# Patient Record
Sex: FEMALE | Race: ASIAN | Employment: FULL TIME | URBAN - METROPOLITAN AREA
[De-identification: names, ages, dates, MRNs, and addresses within clinical notes are randomized per-mention and may not be internally consistent; named-entity substitution may affect disease eponyms.]

---

## 2017-02-05 DIAGNOSIS — E03.9 HYPOTHYROIDISM (ACQUIRED): Primary | ICD-10-CM

## 2017-02-06 RX ORDER — LEVOTHYROXINE SODIUM 0.07 MG/1
TABLET ORAL
Qty: 90 TABLET | Refills: 0 | Status: SHIPPED | OUTPATIENT
Start: 2017-02-06 | End: 2017-05-05

## 2017-02-15 DIAGNOSIS — I10 ESSENTIAL HYPERTENSION: Primary | ICD-10-CM

## 2017-02-16 RX ORDER — LOSARTAN POTASSIUM 50 MG/1
TABLET ORAL
Qty: 90 TABLET | Refills: 0 | Status: SHIPPED | OUTPATIENT
Start: 2017-02-16 | End: 2017-06-13

## 2017-02-16 NOTE — TELEPHONE ENCOUNTER
Rx sent to retail. Note sent as well reminding pt of pending labs. Pt is not due to be seen in the office again until next month.

## 2017-05-05 DIAGNOSIS — E03.9 HYPOTHYROIDISM (ACQUIRED): Primary | ICD-10-CM

## 2017-05-05 RX ORDER — LEVOTHYROXINE SODIUM 0.07 MG/1
TABLET ORAL
Qty: 30 TABLET | Refills: 0 | Status: SHIPPED | OUTPATIENT
Start: 2017-05-05 | End: 2017-06-09

## 2017-05-05 NOTE — TELEPHONE ENCOUNTER
Spoke with patient and advised that she is due for a follow up appointment and labs before further refills. Labs were ordered 9/2016. She verbalized understanding and agreed with plan.    Ok per Provider to send 30 day supply

## 2017-05-08 RX ORDER — LEVOTHYROXINE SODIUM 0.07 MG/1
TABLET ORAL
Qty: 90 TABLET | Refills: 0 | OUTPATIENT
Start: 2017-05-08

## 2017-05-15 DIAGNOSIS — F51.04 PSYCHOPHYSIOLOGICAL INSOMNIA: ICD-10-CM

## 2017-05-16 RX ORDER — ZOLPIDEM TARTRATE 5 MG/1
TABLET ORAL
Qty: 30 TABLET | Refills: 0 | Status: SHIPPED
Start: 2017-05-16 | End: 2017-06-12

## 2017-05-17 ENCOUNTER — OFFICE VISIT (OUTPATIENT)
Dept: INTERNAL MEDICINE CLINIC | Facility: CLINIC | Age: 57
End: 2017-05-17

## 2017-05-17 VITALS
HEART RATE: 90 BPM | SYSTOLIC BLOOD PRESSURE: 114 MMHG | TEMPERATURE: 98 F | OXYGEN SATURATION: 98 % | WEIGHT: 222 LBS | BODY MASS INDEX: 39 KG/M2 | DIASTOLIC BLOOD PRESSURE: 78 MMHG | RESPIRATION RATE: 16 BRPM

## 2017-05-17 DIAGNOSIS — S46.812A TRAPEZIUS STRAIN, LEFT, INITIAL ENCOUNTER: Primary | ICD-10-CM

## 2017-05-17 PROCEDURE — 99213 OFFICE O/P EST LOW 20 MIN: CPT | Performed by: STUDENT IN AN ORGANIZED HEALTH CARE EDUCATION/TRAINING PROGRAM

## 2017-05-17 RX ORDER — METHYLPREDNISOLONE 4 MG/1
TABLET ORAL
Qty: 1 KIT | Refills: 0 | Status: SHIPPED | OUTPATIENT
Start: 2017-05-17 | End: 2017-05-31 | Stop reason: ALTCHOICE

## 2017-05-17 RX ORDER — CYCLOBENZAPRINE HCL 10 MG
10 TABLET ORAL NIGHTLY
Qty: 15 TABLET | Refills: 0 | Status: SHIPPED | OUTPATIENT
Start: 2017-05-17 | End: 2017-10-18 | Stop reason: ALTCHOICE

## 2017-05-17 NOTE — PROGRESS NOTES
HPI:    Patient ID: Luciano Gaytan is a 49year old female who presents today c/o left shoulder pain since Sunday morning. Patient reports doing Veronica class on Saturday. Shoulder Pain   The pain is present in the left shoulder.  This is a new problem LOSARTAN POTASSIUM 50 MG Oral Tab TAKE 1 TABLET (50 MG TOTAL) BY MOUTH DAILY. Disp: 90 tablet Rfl: 0   CALCIUM + D OR Take 1 Tab by mouth daily.  Disp:  Rfl:    [DISCONTINUED] ZOLPIDEM TARTRATE 5 MG Oral Tab TAKE 1 TABLET BY MOUTH DAILY AT BEDTIME AS NEEDED The patient indicates understanding of these issues and agrees to the plan. The patient is asked to return in 7-10 days or sooner if symptoms persist or worsen. No orders of the defined types were placed in this encounter.        Meds This Visit:  Clinton Patino

## 2017-05-17 NOTE — PATIENT INSTRUCTIONS
Muscle Strain in the Extremities  A muscle strain is a stretching and tearing of muscle fibers. This causes pain, especially when you move that muscle. There may also be some swelling and bruising.   Home care  · Keep the hurt area raised to reduce pain a

## 2017-05-20 ENCOUNTER — LAB ENCOUNTER (OUTPATIENT)
Dept: LAB | Facility: HOSPITAL | Age: 57
End: 2017-05-20
Attending: INTERNAL MEDICINE
Payer: COMMERCIAL

## 2017-05-20 DIAGNOSIS — Z13.29 THYROID DISORDER SCREEN: ICD-10-CM

## 2017-05-20 DIAGNOSIS — Z13.0 SCREENING, ANEMIA, DEFICIENCY, IRON: ICD-10-CM

## 2017-05-20 DIAGNOSIS — Z13.220 LIPID SCREENING: ICD-10-CM

## 2017-05-20 DIAGNOSIS — Z13.89 SCREENING FOR GENITOURINARY CONDITION: ICD-10-CM

## 2017-05-20 DIAGNOSIS — D89.9 IMMUNE DISORDER (HCC): ICD-10-CM

## 2017-05-20 DIAGNOSIS — Z00.00 LABORATORY EXAMINATION ORDERED AS PART OF A ROUTINE GENERAL MEDICAL EXAMINATION: ICD-10-CM

## 2017-05-20 PROCEDURE — 86803 HEPATITIS C AB TEST: CPT

## 2017-05-20 PROCEDURE — 84443 ASSAY THYROID STIM HORMONE: CPT

## 2017-05-20 PROCEDURE — 81003 URINALYSIS AUTO W/O SCOPE: CPT

## 2017-05-20 PROCEDURE — 80053 COMPREHEN METABOLIC PANEL: CPT

## 2017-05-20 PROCEDURE — 80061 LIPID PANEL: CPT

## 2017-05-20 PROCEDURE — 85025 COMPLETE CBC W/AUTO DIFF WBC: CPT

## 2017-05-20 PROCEDURE — 83036 HEMOGLOBIN GLYCOSYLATED A1C: CPT

## 2017-05-20 PROCEDURE — 36415 COLL VENOUS BLD VENIPUNCTURE: CPT

## 2017-05-31 ENCOUNTER — OFFICE VISIT (OUTPATIENT)
Dept: INTERNAL MEDICINE CLINIC | Facility: CLINIC | Age: 57
End: 2017-05-31

## 2017-05-31 VITALS
RESPIRATION RATE: 16 BRPM | BODY MASS INDEX: 38.5 KG/M2 | OXYGEN SATURATION: 99 % | WEIGHT: 220 LBS | DIASTOLIC BLOOD PRESSURE: 84 MMHG | TEMPERATURE: 98 F | HEART RATE: 81 BPM | HEIGHT: 63.5 IN | SYSTOLIC BLOOD PRESSURE: 126 MMHG

## 2017-05-31 DIAGNOSIS — S46.812D TRAPEZIUS STRAIN, LEFT, SUBSEQUENT ENCOUNTER: Primary | ICD-10-CM

## 2017-05-31 PROCEDURE — 99213 OFFICE O/P EST LOW 20 MIN: CPT | Performed by: STUDENT IN AN ORGANIZED HEALTH CARE EDUCATION/TRAINING PROGRAM

## 2017-05-31 NOTE — PROGRESS NOTES
[facility]    REASON FOR VISIT:    Current Complaints: Patient presents with:  Lab Results      HPI:  Naren Sawant is a 49year old female who presents for 2 weeks left trapezius strain f/u as well as labwork results review.   Patient report LMP 07/27/2015 (Exact Date)   Wt Readings from Last 6 Encounters:  05/31/17 : 220 lb  05/17/17 : 222 lb  09/12/16 : 217 lb  03/15/16 : 213 lb  01/27/16 : 210 lb  01/12/16 : 210 lb    Body mass index is 38.36 kg/(m^2).      Constitutional: Appears stated ag patient indicates understanding of these issues and agrees to the treatment plan. The patient is asked to return in 3 months for an annual physical or sooner if needed.     Imaging & Consults:  None       Chasity Reed MD  5/31/2017  5:11 PM

## 2017-06-09 DIAGNOSIS — E03.9 HYPOTHYROIDISM (ACQUIRED): Primary | ICD-10-CM

## 2017-06-09 RX ORDER — LEVOTHYROXINE SODIUM 0.07 MG/1
TABLET ORAL
Qty: 30 TABLET | Refills: 5 | Status: SHIPPED | OUTPATIENT
Start: 2017-06-09 | End: 2018-03-21

## 2017-06-13 DIAGNOSIS — I10 ESSENTIAL HYPERTENSION: Primary | ICD-10-CM

## 2017-06-13 RX ORDER — LOSARTAN POTASSIUM 50 MG/1
TABLET ORAL
Qty: 90 TABLET | Refills: 0 | Status: SHIPPED | OUTPATIENT
Start: 2017-06-13 | End: 2017-09-11

## 2017-07-08 DIAGNOSIS — E03.9 HYPOTHYROIDISM (ACQUIRED): ICD-10-CM

## 2017-07-10 DIAGNOSIS — E03.9 HYPOTHYROIDISM (ACQUIRED): ICD-10-CM

## 2017-07-10 RX ORDER — LEVOTHYROXINE SODIUM 0.07 MG/1
TABLET ORAL
Qty: 90 TABLET | Refills: 1 | Status: SHIPPED | OUTPATIENT
Start: 2017-07-10 | End: 2017-10-18 | Stop reason: ALTCHOICE

## 2017-07-10 RX ORDER — LEVOTHYROXINE SODIUM 0.07 MG/1
75 TABLET ORAL
Qty: 30 TABLET | Refills: 5 | OUTPATIENT
Start: 2017-07-10

## 2017-07-10 NOTE — TELEPHONE ENCOUNTER
Fax request from [facility] in [place], [place] for a refill on Levothyroxine 75 mcg qty no listed. Paper in triage.

## 2017-07-13 DIAGNOSIS — F51.04 PSYCHOPHYSIOLOGICAL INSOMNIA: ICD-10-CM

## 2017-07-13 RX ORDER — ZOLPIDEM TARTRATE 5 MG/1
TABLET ORAL
Qty: 30 TABLET | Refills: 2 | OUTPATIENT
Start: 2017-07-13

## 2017-07-14 ENCOUNTER — PATIENT MESSAGE (OUTPATIENT)
Dept: INTERNAL MEDICINE CLINIC | Facility: CLINIC | Age: 57
End: 2017-07-14

## 2017-07-14 ENCOUNTER — TELEPHONE (OUTPATIENT)
Dept: INTERNAL MEDICINE CLINIC | Facility: CLINIC | Age: 57
End: 2017-07-14

## 2017-07-14 NOTE — TELEPHONE ENCOUNTER
Ax request from [facility] in [place], [place] for a PA on Zolipidem Tartrate 5 mg no qty listed. Form in triage.

## 2017-07-14 NOTE — TELEPHONE ENCOUNTER
Spoke wit pharmacy they have refills on file that we faxed 6/13/17 pt does not need new refill from our office.

## 2017-09-11 DIAGNOSIS — F51.04 PSYCHOPHYSIOLOGICAL INSOMNIA: ICD-10-CM

## 2017-09-11 DIAGNOSIS — I10 ESSENTIAL HYPERTENSION: ICD-10-CM

## 2017-09-11 RX ORDER — LOSARTAN POTASSIUM 50 MG/1
TABLET ORAL
Qty: 90 TABLET | Refills: 0 | Status: SHIPPED | OUTPATIENT
Start: 2017-09-11 | End: 2017-10-18

## 2017-09-11 RX ORDER — ZOLPIDEM TARTRATE 5 MG/1
TABLET ORAL
Qty: 30 TABLET | Refills: 0 | OUTPATIENT
Start: 2017-09-11 | End: 2017-10-12

## 2017-10-12 DIAGNOSIS — F51.04 PSYCHOPHYSIOLOGICAL INSOMNIA: ICD-10-CM

## 2017-10-13 RX ORDER — ZOLPIDEM TARTRATE 5 MG/1
TABLET ORAL
Qty: 30 TABLET | Refills: 0 | Status: SHIPPED
Start: 2017-10-13 | End: 2017-11-11

## 2017-10-18 ENCOUNTER — OFFICE VISIT (OUTPATIENT)
Dept: INTERNAL MEDICINE CLINIC | Facility: CLINIC | Age: 57
End: 2017-10-18

## 2017-10-18 VITALS
WEIGHT: 217 LBS | HEIGHT: 63.5 IN | TEMPERATURE: 98 F | RESPIRATION RATE: 16 BRPM | SYSTOLIC BLOOD PRESSURE: 128 MMHG | BODY MASS INDEX: 37.97 KG/M2 | HEART RATE: 92 BPM | DIASTOLIC BLOOD PRESSURE: 86 MMHG | OXYGEN SATURATION: 98 %

## 2017-10-18 DIAGNOSIS — Z12.39 BREAST CANCER SCREENING: ICD-10-CM

## 2017-10-18 DIAGNOSIS — I10 ESSENTIAL HYPERTENSION: ICD-10-CM

## 2017-10-18 DIAGNOSIS — Z00.00 ENCOUNTER FOR ANNUAL PHYSICAL EXAM: Primary | ICD-10-CM

## 2017-10-18 PROCEDURE — 99396 PREV VISIT EST AGE 40-64: CPT | Performed by: STUDENT IN AN ORGANIZED HEALTH CARE EDUCATION/TRAINING PROGRAM

## 2017-10-18 RX ORDER — LOSARTAN POTASSIUM 50 MG/1
50 TABLET ORAL DAILY
Qty: 90 TABLET | Refills: 2 | Status: SHIPPED | OUTPATIENT
Start: 2017-10-18 | End: 2018-09-03

## 2017-10-18 NOTE — PROGRESS NOTES
[facility]    REASON FOR VISIT:    Mario Alberto Hernandez is a 56year old female who presents for an [address]     Current Complaints: Reports compliance with the medications, denies any side effects  Vaccinations: declined FLU vaccin (mg/dL)   Date Value   07/26/2014 104     LDL Cholesterol (mg/dL)   Date Value   05/20/2017 133 (H)       Diabetes Screening  if history of high blood pressure or other  risk factors HgbA1C (%)   Date Value   05/20/2017 5.7 (H)     Glucose (mg/dL)   Date V status: Never Smoker                                                              Smokeless tobacco: Never Used                      Alcohol use:  Yes              Comment: social         REVIEW OF SYSTEMS:   Constitutional: Negative for fever, chills and f BREAST: declined  : declined  Abdominal: Soft. Bowel sounds are normal. There is no tenderness. Musculoskeletal: Normal range of motion. She exhibits no edema. Lymphadenopathy: She has no cervical adenopathy or supraclavicular adenopathy.    Neurolo f/u visit in 3 months. Breast cancer screening  -     [facility] SCREENING BILAT (CPT=77067); Future    Essential hypertension  -     Losartan Potassium 50 MG Oral Tab; Take 1 tablet (50 mg total) by mouth daily.     Other orders  -     Calcium Carb-Cholecalc

## 2017-11-11 DIAGNOSIS — F51.04 PSYCHOPHYSIOLOGICAL INSOMNIA: ICD-10-CM

## 2017-11-13 RX ORDER — ZOLPIDEM TARTRATE 5 MG/1
TABLET ORAL
Qty: 30 TABLET | Refills: 0 | Status: SHIPPED
Start: 2017-11-13 | End: 2017-12-12

## 2017-12-12 DIAGNOSIS — F51.04 PSYCHOPHYSIOLOGICAL INSOMNIA: ICD-10-CM

## 2017-12-13 RX ORDER — ZOLPIDEM TARTRATE 5 MG/1
TABLET ORAL
Qty: 30 TABLET | Refills: 1 | Status: SHIPPED
Start: 2017-12-13 | End: 2018-02-11

## 2018-01-09 DIAGNOSIS — E03.9 HYPOTHYROIDISM (ACQUIRED): ICD-10-CM

## 2018-01-09 RX ORDER — LEVOTHYROXINE SODIUM 0.07 MG/1
TABLET ORAL
Qty: 90 TABLET | Refills: 0 | Status: SHIPPED | OUTPATIENT
Start: 2018-01-09 | End: 2018-02-26 | Stop reason: ALTCHOICE

## 2018-02-11 DIAGNOSIS — F51.04 PSYCHOPHYSIOLOGICAL INSOMNIA: ICD-10-CM

## 2018-02-12 RX ORDER — ZOLPIDEM TARTRATE 5 MG/1
TABLET ORAL
Qty: 30 TABLET | Refills: 0 | Status: SHIPPED
Start: 2018-02-12 | End: 2018-03-13

## 2018-02-17 ENCOUNTER — APPOINTMENT (OUTPATIENT)
Dept: CT IMAGING | Facility: HOSPITAL | Age: 58
End: 2018-02-17
Attending: FAMILY MEDICINE
Payer: COMMERCIAL

## 2018-02-17 ENCOUNTER — HOSPITAL ENCOUNTER (OUTPATIENT)
Age: 58
Discharge: HOME | End: 2018-02-17
Attending: FAMILY MEDICINE
Payer: COMMERCIAL

## 2018-02-17 VITALS
SYSTOLIC BLOOD PRESSURE: 155 MMHG | DIASTOLIC BLOOD PRESSURE: 87 MMHG | RESPIRATION RATE: 16 BRPM | OXYGEN SATURATION: 97 % | BODY MASS INDEX: 39.51 KG/M2 | WEIGHT: 223 LBS | TEMPERATURE: 98 F | HEART RATE: 79 BPM | HEIGHT: 63 IN

## 2018-02-17 DIAGNOSIS — R10.9 RIGHT SIDED ABDOMINAL PAIN: Primary | ICD-10-CM

## 2018-02-17 DIAGNOSIS — M54.50 LOW BACK PAIN WITHOUT SCIATICA, UNSPECIFIED BACK PAIN LATERALITY, UNSPECIFIED CHRONICITY: ICD-10-CM

## 2018-02-17 LAB
#LYMPHOCYTE IC: 2.4 X10ˆ3/UL (ref 0.9–3.2)
#MXD IC: 0.5 X10ˆ3/UL (ref 0.1–1)
#NEUTROPHIL IC: 7.5 X10ˆ3/UL (ref 1.3–6.7)
BASOPHILS # BLD AUTO: 0.06 X10(3) UL (ref 0–0.1)
BASOPHILS NFR BLD AUTO: 0.6 %
CREAT SERPL-MCNC: 0.8 MG/DL (ref 0.55–1.02)
EOSINOPHIL # BLD AUTO: 0.29 X10(3) UL (ref 0–0.3)
EOSINOPHIL NFR BLD AUTO: 2.9 %
ERYTHROCYTE [DISTWIDTH] IN BLOOD BY AUTOMATED COUNT: 12.3 % (ref 11.5–16)
GLUCOSE BLD-MCNC: 107 MG/DL (ref 70–99)
HCT IC: 42.3 % (ref 37–54)
HCT VFR BLD AUTO: 43.6 % (ref 34–50)
HGB BLD-MCNC: 14.9 G/DL (ref 12–16)
HGB IC: 14.4 G/DL (ref 11.7–16)
IMMATURE GRANULOCYTE COUNT: 0.03 X10(3) UL (ref 0–1)
IMMATURE GRANULOCYTE RATIO %: 0.3 %
ISTAT BLOOD GAS TCO2: 27 MMOL/L (ref 22–32)
ISTAT BUN: 10 MG/DL (ref 8–20)
ISTAT CHLORIDE: 104 MMOL/L (ref 101–111)
ISTAT HEMATOCRIT: 42 % (ref 34–50)
ISTAT IONIZED CALCIUM: 1.1 MMOL/L (ref 1.12–1.32)
ISTAT POTASSIUM: 4.2 MMOL/L (ref 3.6–5.1)
ISTAT SODIUM: 140 MMOL/L (ref 136–144)
LYMPHOCYTES # BLD AUTO: 2.36 X10(3) UL (ref 0.9–4)
LYMPHOCYTES NFR BLD AUTO: 23.1 %
LYMPHOCYTES NFR BLD AUTO: 24 %
MCH IC: 30.9 PG (ref 27–33.2)
MCH RBC QN AUTO: 30.5 PG (ref 27–33.2)
MCHC IC: 34 G/DL (ref 31–37)
MCHC RBC AUTO-ENTMCNC: 34.2 G/DL (ref 31–37)
MCV IC: 90.8 FL (ref 81–100)
MCV RBC AUTO: 89.2 FL (ref 81–100)
MIXED CELL %: 4.8 %
MONOCYTES # BLD AUTO: 0.47 X10(3) UL (ref 0.1–1)
MONOCYTES NFR BLD AUTO: 4.8 %
NEUTROPHIL ABS PRELIM: 6.63 X10 (3) UL (ref 1.3–6.7)
NEUTROPHILS # BLD AUTO: 6.63 X10(3) UL (ref 1.3–6.7)
NEUTROPHILS NFR BLD AUTO: 67.4 %
NEUTROPHILS NFR BLD AUTO: 72.1 %
PLATELET # BLD AUTO: 327 10(3)UL (ref 150–450)
PLT IC: 334 X10ˆ3/UL (ref 150–450)
POCT BILIRUBIN URINE: NEGATIVE
POCT BLOOD URINE: NEGATIVE
POCT GLUCOSE URINE: NEGATIVE MG/DL
POCT KETONE URINE: NEGATIVE MG/DL
POCT LEUKOCYTE ESTERASE URINE: NEGATIVE
POCT NITRITE URINE: NEGATIVE
POCT PH URINE: 5.5 (ref 5–8)
POCT PROTEIN URINE: NEGATIVE MG/DL
POCT SPECIFIC GRAVITY URINE: 1.02
POCT URINE CLARITY: CLEAR
POCT UROBILINOGEN URINE: 0.2 MG/DL
RBC # BLD AUTO: 4.89 X10(6)UL (ref 3.8–5.1)
RBC IC: 4.66 X10ˆ6/UL (ref 3.8–5.1)
RED CELL DISTRIBUTION WIDTH-SD: 40 FL (ref 35.1–46.3)
WBC # BLD AUTO: 9.8 X10(3) UL (ref 4–13)
WBC IC: 10.4 X10ˆ3/UL (ref 4–13)

## 2018-02-17 PROCEDURE — 96360 HYDRATION IV INFUSION INIT: CPT

## 2018-02-17 PROCEDURE — 80047 BASIC METABLC PNL IONIZED CA: CPT

## 2018-02-17 PROCEDURE — 85025 COMPLETE CBC W/AUTO DIFF WBC: CPT | Performed by: FAMILY MEDICINE

## 2018-02-17 PROCEDURE — 74177 CT ABD & PELVIS W/CONTRAST: CPT | Performed by: FAMILY MEDICINE

## 2018-02-17 PROCEDURE — 99214 OFFICE O/P EST MOD 30 MIN: CPT

## 2018-02-17 PROCEDURE — 99204 OFFICE O/P NEW MOD 45 MIN: CPT

## 2018-02-17 PROCEDURE — 81002 URINALYSIS NONAUTO W/O SCOPE: CPT | Performed by: FAMILY MEDICINE

## 2018-02-17 RX ORDER — METRONIDAZOLE 500 MG/1
500 TABLET ORAL 3 TIMES DAILY
Qty: 30 TABLET | Refills: 0 | Status: SHIPPED | OUTPATIENT
Start: 2018-02-17 | End: 2018-10-23

## 2018-02-17 RX ORDER — ONDANSETRON 4 MG/1
4 TABLET, ORALLY DISINTEGRATING ORAL EVERY 6 HOURS PRN
Qty: 21 TABLET | Refills: 0 | Status: SHIPPED | OUTPATIENT
Start: 2018-02-17 | End: 2018-10-23

## 2018-02-17 RX ORDER — CIPROFLOXACIN 500 MG/1
500 TABLET, FILM COATED ORAL 2 TIMES DAILY
Qty: 20 TABLET | Refills: 0 | Status: SHIPPED | OUTPATIENT
Start: 2018-02-17 | End: 2018-02-27

## 2018-02-17 NOTE — ED PROVIDER NOTES
Patient Seen in: [facility]    History   Patient presents with:  Abdomen/Flank Pain (GI/)  Back Pain (musculoskeletal)    Stated Complaint: ABDOMINAL AND LOW BACL PAIN X 2 DAYS    HPI  53-year-old female coming in with co noted above.     Physical Exam   ED Triage Vitals [02/17/18 0818]  BP: 154/88  Pulse: 81  Resp: 14  Temp: 98.8 °F (37.1 °C)  Temp src: n/a  SpO2: 97 %  O2 Device: None (Room air)    Current:/88   Pulse 81   Temp 98.8 °F (37.1 °C)   Resp 14   Ht 160 cm chloride 0.9% IV bolus 500 mL     Mildly elevated neutrophils noted, normal creatinine noted. Patient was given an IV bolus of 500 mL while here in immediate care prior to CT that was ordered.   Patient educated remain n.p.o.  ED Course as of Feb 17 0950

## 2018-02-17 NOTE — ED INITIAL ASSESSMENT (HPI)
2 days ago, patient is feeling cramping/bloating in mid pelvis with intermittent constipation. Also c/o tightness in the mid lower back. Denies nausea/vomiting or fevers. Using Miralax and Gas X last night.   Last BM was yesterday evening and stated it w

## 2018-02-26 ENCOUNTER — OFFICE VISIT (OUTPATIENT)
Dept: INTERNAL MEDICINE CLINIC | Facility: CLINIC | Age: 58
End: 2018-02-26

## 2018-02-26 VITALS
DIASTOLIC BLOOD PRESSURE: 86 MMHG | TEMPERATURE: 98 F | WEIGHT: 218 LBS | RESPIRATION RATE: 16 BRPM | HEART RATE: 91 BPM | HEIGHT: 63.5 IN | OXYGEN SATURATION: 98 % | BODY MASS INDEX: 38.15 KG/M2 | SYSTOLIC BLOOD PRESSURE: 124 MMHG

## 2018-02-26 DIAGNOSIS — L71.9 ACNE ROSACEA: ICD-10-CM

## 2018-02-26 DIAGNOSIS — Z12.11 COLON CANCER SCREENING: ICD-10-CM

## 2018-02-26 DIAGNOSIS — K57.92 ACUTE DIVERTICULITIS: Primary | ICD-10-CM

## 2018-02-26 PROCEDURE — 99214 OFFICE O/P EST MOD 30 MIN: CPT | Performed by: STUDENT IN AN ORGANIZED HEALTH CARE EDUCATION/TRAINING PROGRAM

## 2018-02-26 RX ORDER — METRONIDAZOLE 10 MG/G
1 GEL TOPICAL DAILY
Qty: 60 G | Refills: 1 | Status: SHIPPED | OUTPATIENT
Start: 2018-02-26

## 2018-02-26 NOTE — PROGRESS NOTES
[facility]    REASON FOR VISIT:    Current Complaints: Patient presents with:  Abdominal Pain: Couple weeks,       HPI:  Jalloh Phlegm is a 56year old female who presents for for UC f/u.     Don was seen in UC on 2/17/18 with complaints total) by mouth 2 (two) times daily. Disp: 20 tablet Rfl: 0   metRONIDAZOLE 500 MG Oral Tab Take 1 tablet (500 mg total) by mouth 3 (three) times daily.  Disp: 30 tablet Rfl: 0   ZOLPIDEM TARTRATE 5 MG Oral Tab TAKE 1 TABLET BY MOUTH EVERY DAY AT BEDTIME AS thyromegaly present. Cardiovascular: Normal rate, regular rhythm and normal heart sounds. Exam reveals no friction rub. No murmur heard. Pulmonary/Chest: Effort normal and breath sounds normal throughout lung fields. No wheezes or rales appreciated.

## 2018-02-26 NOTE — PATIENT INSTRUCTIONS
Treating Rosacea     Use sunscreen with at least SPF 15 or more every day. There is no cure for rosacea. But rosacea can be controlled in most cases, Cherl Rotunda your symptoms.   Your healthcare provider may prescrib · Avoid putting steroids on the skin sores. Steroids may make rosacea worse.   Getting good results  Learning about rosacea and treating it early is the first step toward controlling this disease.  With proper treatment and self-care, you can manage your sy Some men with a more severe form of rosacea develop a condition called rhinophyma. The oil glands on the skin of the nose become blocked and the nose gets bigger. The cheeks also become puffy. Alcohol may increase the flushing.  But this condition is not ca © 1015-1191 [address] [facility] [place]. All rights reserved. This information is not intended as a substitute for professional medical care. Always follow your healthcare professional's instructions.         Asuncion Dunham · For severe symptoms. You may need to be admitted to the hospital. There, you can be given IV antibiotics and fluids. You will also be put on a low-fiber or liquid diet. Although not common, surgery is needed in some people with severe symptoms.   Keys to

## 2018-03-13 DIAGNOSIS — F51.04 PSYCHOPHYSIOLOGICAL INSOMNIA: ICD-10-CM

## 2018-03-14 RX ORDER — ZOLPIDEM TARTRATE 5 MG/1
TABLET ORAL
Qty: 30 TABLET | Refills: 1 | Status: SHIPPED
Start: 2018-03-14 | End: 2018-03-21

## 2018-03-16 ENCOUNTER — OFFICE VISIT (OUTPATIENT)
Dept: INTERNAL MEDICINE CLINIC | Facility: CLINIC | Age: 58
End: 2018-03-16

## 2018-03-16 ENCOUNTER — TELEPHONE (OUTPATIENT)
Dept: INTERNAL MEDICINE CLINIC | Facility: CLINIC | Age: 58
End: 2018-03-16

## 2018-03-16 VITALS
WEIGHT: 219 LBS | OXYGEN SATURATION: 98 % | BODY MASS INDEX: 38.32 KG/M2 | HEIGHT: 63.5 IN | RESPIRATION RATE: 18 BRPM | TEMPERATURE: 98 F | DIASTOLIC BLOOD PRESSURE: 76 MMHG | HEART RATE: 93 BPM | SYSTOLIC BLOOD PRESSURE: 128 MMHG

## 2018-03-16 DIAGNOSIS — R10.32 LLQ ABDOMINAL PAIN: ICD-10-CM

## 2018-03-16 DIAGNOSIS — K57.92 DIVERTICULITIS: Primary | ICD-10-CM

## 2018-03-16 PROCEDURE — 99214 OFFICE O/P EST MOD 30 MIN: CPT | Performed by: INTERNAL MEDICINE

## 2018-03-16 RX ORDER — LEVOFLOXACIN 500 MG/1
500 TABLET, FILM COATED ORAL DAILY
Qty: 10 TABLET | Refills: 0 | Status: ON HOLD | OUTPATIENT
Start: 2018-03-16 | End: 2018-03-24

## 2018-03-16 RX ORDER — METRONIDAZOLE 500 MG/1
500 TABLET ORAL 3 TIMES DAILY
Qty: 30 TABLET | Refills: 0 | Status: ON HOLD | OUTPATIENT
Start: 2018-03-16 | End: 2018-03-24

## 2018-03-16 NOTE — TELEPHONE ENCOUNTER
Pt says she went to urgent care about 1 month ago for a diverticulitis flare and was given and finished antibiotics, and now she is feeling the same pain since yesterday. Can we prescribe another abx or does she need to be seen? Please c/b at [phone].

## 2018-03-16 NOTE — PROGRESS NOTES
HPI:    Patient ID: Casandra Huston is a 56year old female. Abdominal Pain   This is a recurrent problem. Episode onset: 4 weeks ago, now with new sxs 2 weeks after ending abx. The onset quality is sudden. The problem occurs constantly.  The problem 3350-KCl-NaBcb-NaCl-NaSulf (PEG 3350/ELECTROLYTES) 240 g Oral Recon Soln Take as directed by physician. Disp: 4000 mL Rfl: 0   Losartan Potassium 50 MG Oral Tab Take 1 tablet (50 mg total) by mouth daily.  Disp: 90 tablet Rfl: 2   LEVOTHYROXINE SODIUM 75 MC

## 2018-03-16 NOTE — PATIENT INSTRUCTIONS
Diverticulitis    Some people get pouches along the wall of the colon as they get older. The pouches, called diverticuli, usually cause no symptoms. If the pouches become blocked, you can get an infection. This infection is called diverticulitis.  It caus Once you have an episode of diverticulitis, you are at risk for having it again. After you have recovered from this episode, you may be able to lower your risk by eating a high-fiber diet (20 gm/day to 35 gm/day of fiber).  This cleans out the colon pouches © 2013-5934 [address] [facility] [place], [facility] [place]. All rights reserved. This information is not intended as a substitute for professional medical care. Always follow your healthcare professional's instructions.

## 2018-03-21 ENCOUNTER — TELEPHONE (OUTPATIENT)
Dept: INTERNAL MEDICINE CLINIC | Facility: CLINIC | Age: 58
End: 2018-03-21

## 2018-03-21 ENCOUNTER — HOSPITAL ENCOUNTER (OUTPATIENT)
Facility: HOSPITAL | Age: 58
Setting detail: OBSERVATION
Discharge: HOME | End: 2018-03-24
Attending: EMERGENCY MEDICINE | Admitting: HOSPITALIST
Payer: COMMERCIAL

## 2018-03-21 ENCOUNTER — APPOINTMENT (OUTPATIENT)
Dept: CT IMAGING | Facility: HOSPITAL | Age: 58
End: 2018-03-21
Attending: EMERGENCY MEDICINE
Payer: COMMERCIAL

## 2018-03-21 DIAGNOSIS — K57.92 ACUTE DIVERTICULITIS: Primary | ICD-10-CM

## 2018-03-21 LAB
ALBUMIN SERPL-MCNC: 3.5 G/DL (ref 3.5–4.8)
ALP LIVER SERPL-CCNC: 66 U/L (ref 41–108)
ALT SERPL-CCNC: 19 U/L (ref 14–54)
AST SERPL-CCNC: 13 U/L (ref 15–41)
BASOPHILS # BLD AUTO: 0.05 X10(3) UL (ref 0–0.1)
BASOPHILS NFR BLD AUTO: 0.4 %
BILIRUB SERPL-MCNC: 0.4 MG/DL (ref 0.1–2)
BILIRUB UR QL STRIP.AUTO: NEGATIVE
BUN BLD-MCNC: 11 MG/DL (ref 8–20)
CALCIUM BLD-MCNC: 9 MG/DL (ref 8.3–10.3)
CHLORIDE: 103 MMOL/L (ref 101–111)
CLARITY UR REFRACT.AUTO: CLEAR
CO2: 27 MMOL/L (ref 22–32)
CREAT BLD-MCNC: 0.94 MG/DL (ref 0.55–1.02)
EOSINOPHIL # BLD AUTO: 0.1 X10(3) UL (ref 0–0.3)
EOSINOPHIL NFR BLD AUTO: 0.7 %
ERYTHROCYTE [DISTWIDTH] IN BLOOD BY AUTOMATED COUNT: 12.3 % (ref 11.5–16)
GLUCOSE BLD-MCNC: 136 MG/DL (ref 70–99)
GLUCOSE UR STRIP.AUTO-MCNC: NEGATIVE MG/DL
HCT VFR BLD AUTO: 43.5 % (ref 34–50)
HGB BLD-MCNC: 15 G/DL (ref 12–16)
IMMATURE GRANULOCYTE COUNT: 0.04 X10(3) UL (ref 0–1)
IMMATURE GRANULOCYTE RATIO %: 0.3 %
KETONES UR STRIP.AUTO-MCNC: NEGATIVE MG/DL
LIPASE: 186 U/L (ref 73–393)
LYMPHOCYTES # BLD AUTO: 1.52 X10(3) UL (ref 0.9–4)
LYMPHOCYTES NFR BLD AUTO: 11 %
M PROTEIN MFR SERPL ELPH: 7.8 G/DL (ref 6.1–8.3)
MCH RBC QN AUTO: 30.4 PG (ref 27–33.2)
MCHC RBC AUTO-ENTMCNC: 34.5 G/DL (ref 31–37)
MCV RBC AUTO: 88.2 FL (ref 81–100)
MONOCYTES # BLD AUTO: 0.56 X10(3) UL (ref 0.1–1)
MONOCYTES NFR BLD AUTO: 4.1 %
NEUTROPHIL ABS PRELIM: 11.54 X10 (3) UL (ref 1.3–6.7)
NEUTROPHILS # BLD AUTO: 11.54 X10(3) UL (ref 1.3–6.7)
NEUTROPHILS NFR BLD AUTO: 83.5 %
NITRITE UR QL STRIP.AUTO: NEGATIVE
PH UR STRIP.AUTO: 5 [PH] (ref 4.5–8)
PLATELET # BLD AUTO: 332 10(3)UL (ref 150–450)
POTASSIUM SERPL-SCNC: 4 MMOL/L (ref 3.6–5.1)
PROT UR STRIP.AUTO-MCNC: NEGATIVE MG/DL
RBC # BLD AUTO: 4.93 X10(6)UL (ref 3.8–5.1)
RBC UR QL AUTO: NEGATIVE
RED CELL DISTRIBUTION WIDTH-SD: 40 FL (ref 35.1–46.3)
SODIUM SERPL-SCNC: 137 MMOL/L (ref 136–144)
SP GR UR STRIP.AUTO: 1 (ref 1–1.03)
UROBILINOGEN UR STRIP.AUTO-MCNC: <2 MG/DL
WBC # BLD AUTO: 13.8 X10(3) UL (ref 4–13)

## 2018-03-21 PROCEDURE — 99220 INITIAL OBSERVATION CARE,LEVL III: CPT | Performed by: HOSPITALIST

## 2018-03-21 PROCEDURE — 74177 CT ABD & PELVIS W/CONTRAST: CPT | Performed by: EMERGENCY MEDICINE

## 2018-03-21 RX ORDER — MORPHINE SULFATE 4 MG/ML
2 INJECTION, SOLUTION INTRAMUSCULAR; INTRAVENOUS EVERY 2 HOUR PRN
Status: DISCONTINUED | OUTPATIENT
Start: 2018-03-21 | End: 2018-03-23

## 2018-03-21 RX ORDER — ONDANSETRON 2 MG/ML
4 INJECTION INTRAMUSCULAR; INTRAVENOUS ONCE
Status: COMPLETED | OUTPATIENT
Start: 2018-03-21 | End: 2018-03-21

## 2018-03-21 RX ORDER — MORPHINE SULFATE 4 MG/ML
4 INJECTION, SOLUTION INTRAMUSCULAR; INTRAVENOUS EVERY 2 HOUR PRN
Status: DISCONTINUED | OUTPATIENT
Start: 2018-03-21 | End: 2018-03-23

## 2018-03-21 RX ORDER — SODIUM CHLORIDE 9 MG/ML
INJECTION, SOLUTION INTRAVENOUS CONTINUOUS
Status: DISCONTINUED | OUTPATIENT
Start: 2018-03-21 | End: 2018-03-23

## 2018-03-21 RX ORDER — SODIUM CHLORIDE 9 MG/ML
INJECTION, SOLUTION INTRAVENOUS CONTINUOUS
Status: DISCONTINUED | OUTPATIENT
Start: 2018-03-21 | End: 2018-03-21

## 2018-03-21 RX ORDER — LEVOTHYROXINE SODIUM 0.07 MG/1
75 TABLET ORAL
Status: DISCONTINUED | OUTPATIENT
Start: 2018-03-22 | End: 2018-03-24

## 2018-03-21 RX ORDER — MORPHINE SULFATE 4 MG/ML
1 INJECTION, SOLUTION INTRAMUSCULAR; INTRAVENOUS EVERY 2 HOUR PRN
Status: DISCONTINUED | OUTPATIENT
Start: 2018-03-21 | End: 2018-03-23

## 2018-03-21 RX ORDER — ONDANSETRON 2 MG/ML
4 INJECTION INTRAMUSCULAR; INTRAVENOUS EVERY 6 HOURS PRN
Status: DISCONTINUED | OUTPATIENT
Start: 2018-03-21 | End: 2018-03-24

## 2018-03-21 RX ORDER — MORPHINE SULFATE 4 MG/ML
INJECTION, SOLUTION INTRAMUSCULAR; INTRAVENOUS
Status: DISPENSED
Start: 2018-03-21 | End: 2018-03-22

## 2018-03-21 RX ORDER — ZOLPIDEM TARTRATE 5 MG/1
5 TABLET ORAL NIGHTLY PRN
COMMUNITY
End: 2018-12-17 | Stop reason: ALTCHOICE

## 2018-03-21 RX ORDER — CALCIUM CARBONATE/VITAMIN D3 600 MG-10
1 TABLET ORAL DAILY
COMMUNITY

## 2018-03-21 RX ORDER — ENOXAPARIN SODIUM 100 MG/ML
40 INJECTION SUBCUTANEOUS NIGHTLY
Status: DISCONTINUED | OUTPATIENT
Start: 2018-03-21 | End: 2018-03-24

## 2018-03-21 RX ORDER — MORPHINE SULFATE 4 MG/ML
4 INJECTION, SOLUTION INTRAMUSCULAR; INTRAVENOUS ONCE
Status: COMPLETED | OUTPATIENT
Start: 2018-03-21 | End: 2018-03-21

## 2018-03-21 RX ORDER — LEVOTHYROXINE SODIUM 0.07 MG/1
75 TABLET ORAL
COMMUNITY
End: 2018-12-17 | Stop reason: DRUGHIGH

## 2018-03-21 RX ORDER — LOSARTAN POTASSIUM 50 MG/1
50 TABLET ORAL DAILY
Status: DISCONTINUED | OUTPATIENT
Start: 2018-03-22 | End: 2018-03-24

## 2018-03-21 RX ORDER — ACETAMINOPHEN 325 MG/1
650 TABLET ORAL EVERY 6 HOURS PRN
Status: DISCONTINUED | OUTPATIENT
Start: 2018-03-21 | End: 2018-03-24

## 2018-03-21 NOTE — ED NOTES
Report called to [facility] on ortho/spine [id number]. Awaiting transport. Patient awake and alert with no new complaints.

## 2018-03-21 NOTE — ED PROVIDER NOTES
Patient Seen in: [facility] Emergency Department    History   Patient presents with:  Abdomen/Flank Pain (GI/)    Stated Complaint: abd pain    HPI    Patient is a 30-year-old female who presents for evaluation of diffuse low abdominal pain today. Head: Normocephalic and atraumatic. Mouth/Throat: Oropharynx is clear and moist.   Eyes: Conjunctivae and EOM are normal. Pupils are equal, round, and reactive to light. Neck: Normal range of motion. Neck supple.    Cardiovascular: Normal rate, regula CULTURE, ROUTINE     Ct Abdomen+pelvis(contrast Only)(cpt=74177)    Result Date: 3/21/2018  CONCLUSION:  Inflammatory changes of the sigmoid colon are compatible with diverticulitis.   There are a few locules of localized extraluminal air which are new from

## 2018-03-21 NOTE — H&P
[facility]  History and Physical     Sage Thomas Patient Status:  Emergency    1960 MRN [id number]   Location [facility] [facility] Attending Lenard Severe, MD   Hosp Day # 0 PCP Sumit Bolden MD     Chief Complaint mouth daily. Disp: 10 tablet Rfl: 0   metRONIDAZOLE (FLAGYL) 500 MG Oral Tab Take 1 tablet (500 mg total) by mouth 3 (three) times daily.  Disp: 30 tablet Rfl: 0   ZOLPIDEM TARTRATE 5 MG Oral Tab TAKE 1 TABLET BY MOUTH EVERY DAY AT BEDTIME AS NEEDED Disp: 3 CREATSERUM  0.94   GFRAA  79   GFRNAA  69   CA  9.0   ALB  3.5   NA  137   K  4.0   CL  103   CO2  27.0   ALKPHO  66   AST  13*   ALT  19   BILT  0.4   TP  7.8       Estimated Creatinine Clearance: 55.9 mL/min (based on SCr of 0.94 mg/dL).     No results

## 2018-03-21 NOTE — TELEPHONE ENCOUNTER
Spoke with pt she was treated for diverticulitis on 3/16/18 and is currently still on antibiotics. Pt reports her abdominal pain has worsened today and she has nausea. Pt was instructed to go to [facility] for evaluation. She expressed understanding.

## 2018-03-21 NOTE — CONSULTS
[facility]  Consultation     Lauren Smith Patient Status:  Emergency    1960 MRN [id number]   Location [address] [facility] [address] Attending Jay Jarvis MD   [facility] Day # 0 PCP Venkat Razo MD     Date of Admission:  3/21/201 History   Problem Relation Age of Onset   • prostate cancer [Other] [OTHER] Father    • fibroids, uterine [Other] [OTHER] Sister 50     hysterectomy      reports that she has never smoked.  She has never used smokeless tobacco. She reports that she drinks a Lungs: No respiratory distress  Cardiac: Regular rate and rhythm. Abdomen:  Soft, non-distended, moderate tenderness in the left lower quadrant and suprapubic region, no rebound or guarding. No peritoneal signs. No ascites.   Liver is within normal limi colon.  BOWEL/MESENTERY:  There are scattered colonic diverticula present. Inflammatory changes are present surrounding diverticula of the distal sigmoid colon (series 2, image 74 through 76) measures favored represent diverticulitis.   There are a few sca for now  Antiemetics, analgesics prn   GI/DVT prophylaxis  No acute surgical intervention at this time  If the patient fails to improve or worsens, then urgent surgical intervention may be required this admission which would mandate resection with colostom

## 2018-03-21 NOTE — ED INITIAL ASSESSMENT (HPI)
Pt to ED with c/o LLQ pain. Pt was started on antibiotics on Friday for diverticulitis. +Nausea. Denies vomiting or rectal bleeding. Sts abd pain is increasing.

## 2018-03-21 NOTE — TELEPHONE ENCOUNTER
Patient called and stated she has seen Dr Yi Briones for diverticulitis and is in a lot of pain. Please advise.

## 2018-03-22 LAB
BASOPHILS # BLD AUTO: 0.04 X10(3) UL (ref 0–0.1)
BASOPHILS NFR BLD AUTO: 0.3 %
BUN BLD-MCNC: 8 MG/DL (ref 8–20)
CALCIUM BLD-MCNC: 8.5 MG/DL (ref 8.3–10.3)
CHLORIDE: 106 MMOL/L (ref 101–111)
CO2: 29 MMOL/L (ref 22–32)
CREAT BLD-MCNC: 1.11 MG/DL (ref 0.55–1.02)
EOSINOPHIL # BLD AUTO: 0.03 X10(3) UL (ref 0–0.3)
EOSINOPHIL NFR BLD AUTO: 0.2 %
ERYTHROCYTE [DISTWIDTH] IN BLOOD BY AUTOMATED COUNT: 12.4 % (ref 11.5–16)
GLUCOSE BLD-MCNC: 105 MG/DL (ref 65–99)
GLUCOSE BLD-MCNC: 115 MG/DL (ref 70–99)
HCT VFR BLD AUTO: 40.5 % (ref 34–50)
HGB BLD-MCNC: 13.4 G/DL (ref 12–16)
IMMATURE GRANULOCYTE COUNT: 0.05 X10(3) UL (ref 0–1)
IMMATURE GRANULOCYTE RATIO %: 0.4 %
LYMPHOCYTES # BLD AUTO: 2.12 X10(3) UL (ref 0.9–4)
LYMPHOCYTES NFR BLD AUTO: 16.8 %
MCH RBC QN AUTO: 30 PG (ref 27–33.2)
MCHC RBC AUTO-ENTMCNC: 33.1 G/DL (ref 31–37)
MCV RBC AUTO: 90.8 FL (ref 81–100)
MONOCYTES # BLD AUTO: 0.68 X10(3) UL (ref 0.1–1)
MONOCYTES NFR BLD AUTO: 5.4 %
NEUTROPHIL ABS PRELIM: 9.68 X10 (3) UL (ref 1.3–6.7)
NEUTROPHILS # BLD AUTO: 9.68 X10(3) UL (ref 1.3–6.7)
NEUTROPHILS NFR BLD AUTO: 76.9 %
PLATELET # BLD AUTO: 321 10(3)UL (ref 150–450)
POTASSIUM SERPL-SCNC: 3.9 MMOL/L (ref 3.6–5.1)
RBC # BLD AUTO: 4.46 X10(6)UL (ref 3.8–5.1)
RED CELL DISTRIBUTION WIDTH-SD: 41 FL (ref 35.1–46.3)
SODIUM SERPL-SCNC: 141 MMOL/L (ref 136–144)
WBC # BLD AUTO: 12.6 X10(3) UL (ref 4–13)

## 2018-03-22 PROCEDURE — 99244 OFF/OP CNSLTJ NEW/EST MOD 40: CPT | Performed by: SURGERY

## 2018-03-22 PROCEDURE — 99225 SUBSEQUENT OBSERVATION CARE: CPT | Performed by: STUDENT IN AN ORGANIZED HEALTH CARE EDUCATION/TRAINING PROGRAM

## 2018-03-22 RX ORDER — KETOROLAC TROMETHAMINE 30 MG/ML
30 INJECTION, SOLUTION INTRAMUSCULAR; INTRAVENOUS EVERY 6 HOURS PRN
Status: DISCONTINUED | OUTPATIENT
Start: 2018-03-22 | End: 2018-03-24

## 2018-03-22 NOTE — PROGRESS NOTES
[facility]  Progress Note    Lysle Learn Patient Status:  Observation    1960 MRN [id number]   [facility] 3SW-A Attending Olena Benson MD   Hosp Day # 0 PCP Jim Rodriguez MD     Subjective:  Patient feeling better.  Report antibiotics   4. Encouraged ambulation  5. Pain control as needed  6. DVT prophylaxis    My total face time with this patient was 24 minutes.   Greater than half of our visit was spent in counseling the patient on the above listed diagnoses and treatment op

## 2018-03-22 NOTE — PROGRESS NOTES
[facility]  Progress Note     Lauren Smith Patient Status:  Observation    1960 MRN [id number]   [facility] 3SW-A Attending Fausto Vásquez MD   Hosp Day # 0 PCP Lisa Hu MD     Chief Complaint: Abdominal pain     S: diverticulitis  2. Hypertension  3.  Hypothyroid     Plan of care:   Continue abx  Sx on Cs  Trial toradol for pain control rather than Mp    Quality:  · DVT Prophylaxis: lovenox  · CODE status: full  · Ramirez: no  · Central line: no    Estimated date of dis

## 2018-03-22 NOTE — PAYOR COMM NOTE
--------------  ADMISSION REVIEW       3/21    ED LATE 1112 PM    Patient presents with:  Abdomen/Flank Pain      Stated Complaint: abd pain        Patient is a 53-year-old female who presents for evaluation of diffuse low abdominal pain today.     She was following:      Leukocyte Esterase Urine Trace (*)       Mucous Urine 1+ (*)       All other components within normal limits   CBC W/ DIFFERENTIAL - Abnormal; Notable for the following:      WBC 13.8 (*)       Neutrophil Absolute Prelim 11.54 (*)       Oksana to be admitted for IV antibiotics.                  Clinical Impression:  Acute diverticulitis POSSIBLE MICROPERFORATION, RECURRENT  FAILED OP ABX  2 COURSES/SEEN PRIOR AT URGENT CARE      BOWEL REST  MORPHINE IV X 6  ZOFRAN IV X 2  ZOSYN IV Q8

## 2018-03-22 NOTE — PROGRESS NOTES
[facility]  Progress Note     Galina Torres Patient Status:  Observation    1960 MRN [id number]   [facility] 3SW-A Attending Sameera Hercules MD   Hosp Day # 0 PCP Kenya Patterson MD     Chief Complaint: Abdominal pain    S: P Epic.    Medications:   • enoxaparin  40 mg Subcutaneous Nightly   • piperacillin-tazobactam  3.375 g Intravenous Q8H   • Levothyroxine Sodium  75 mcg Oral Before breakfast   • Losartan Potassium  50 mg Oral Daily       ASSESSMENT / PLAN:     1.  Diverticul

## 2018-03-23 PROCEDURE — 99224 SUBSEQUENT OBSERVATION CARE: CPT | Performed by: SURGERY

## 2018-03-23 PROCEDURE — 99225 SUBSEQUENT OBSERVATION CARE: CPT | Performed by: STUDENT IN AN ORGANIZED HEALTH CARE EDUCATION/TRAINING PROGRAM

## 2018-03-23 RX ORDER — MORPHINE SULFATE 4 MG/ML
2 INJECTION, SOLUTION INTRAMUSCULAR; INTRAVENOUS EVERY 4 HOURS PRN
Status: DISCONTINUED | OUTPATIENT
Start: 2018-03-23 | End: 2018-03-24

## 2018-03-23 RX ORDER — MIDAZOLAM HYDROCHLORIDE 1 MG/ML
INJECTION INTRAMUSCULAR; INTRAVENOUS
Status: DISCONTINUED
Start: 2018-03-23 | End: 2018-03-23 | Stop reason: WASHOUT

## 2018-03-23 NOTE — PLAN OF CARE
PAIN - ADULT    • Verbalizes/displays adequate comfort level or patient's stated pain goal Progressing        Verbalized having intermittent abdominal pain with occasional nausea without emesis.   Had 3 soft bowel movement with dark green stool in small elmira

## 2018-03-23 NOTE — CONSULTS
Anupama Littlejohn is a 56year old female  Patient presents with:  Abdomen/Flank Pain (GI/)      REFERRED BY    Patient presents with right lower quadrant and suprapubic pain.   Patient states that she was diagnosed with diverticulitis approximately 1 mo MOUTH DAILY.  Disp: 30 tablet Rfl: 5     @ALL@  Family History   Problem Relation Age of Onset   • prostate cancer [Other] [OTHER] Father    • fibroids, uterine [Other] [OTHER] Sister 50     hysterectomy     Smoking status: Never Smoker midline  LYMPHATIC: no axillary , supraclavicular or inguinal lymphadenopathy  EXTREMITIES: no cyanosis, clubbing or edema, no atrophy, full ROM    STUDIES:     Admission on 02/17/2018, Discharged on 02/17/2018   Component Date Value Ref Range Status   • U PLT 02/17/2018 327.0  150.0 - 450.0 10(3)uL Final   • MCV 02/17/2018 89.2  81.0 - 100.0 fL Final   • MCH 02/17/2018 30.5  27.0 - 33.2 pg Final   • MCHC 02/17/2018 34.2  31.0 - 37.0 g/dL Final   • RDW 02/17/2018 12.3  11.5 - 16.0 % Final   • RDW-SD 02/17/20 diverticulitis in the pathophysiology involved in this process. We will see her again tomorrow. Once her pain is improved will discharge on oral antibiotics.       Meds & Refills for this Visit:  No prescriptions requested or ordered in this encounter

## 2018-03-23 NOTE — DIETARY NOTE
Nutrition Short Note    Dietitian consult received for diet education. Reviewed and provided handouts on full liquid and low residue guidelines, foods to avoid and items appropriate to include-sample menus given. No barriers noted, receptive.  Pt verbaliz

## 2018-03-23 NOTE — PROGRESS NOTES
[facility]  Progress Note    Jenniferdewayne David Patient Status:  Observation    1960 MRN [id number]   [facility] 3SW-A Attending Nicolas Andrade MD   Hosp Day # 0 PCP Braden Allison MD     Subjective:  Patient still reports abdomina

## 2018-03-23 NOTE — PROGRESS NOTES
[facility]  Progress Note     [place] Patient Status:  Observation    1960 MRN [id number]   [facility] 3SW-A Attending Abby Schroeder MD   Hosp Day # 0 PCP Patricia Mares MD     Chief Complaint: Abdominal pain     S: diverticulitis  2. Hypertension  3.  Hypothyroid     Plan of care:   Continue abx  Adv diet as tolerated     Quality:  · DVT Prophylaxis: lovenox  · CODE status: full  · Ramirez: no  · Central line: no    Estimated date of discharge: tbd  Discharge is depende

## 2018-03-24 VITALS
HEART RATE: 88 BPM | RESPIRATION RATE: 18 BRPM | HEIGHT: 63 IN | BODY MASS INDEX: 38.8 KG/M2 | TEMPERATURE: 98 F | OXYGEN SATURATION: 97 % | SYSTOLIC BLOOD PRESSURE: 118 MMHG | DIASTOLIC BLOOD PRESSURE: 80 MMHG | WEIGHT: 219 LBS

## 2018-03-24 LAB
BASOPHILS # BLD AUTO: 0.03 X10(3) UL (ref 0–0.1)
BASOPHILS NFR BLD AUTO: 0.2 %
BUN BLD-MCNC: 7 MG/DL (ref 8–20)
CALCIUM BLD-MCNC: 8.4 MG/DL (ref 8.3–10.3)
CHLORIDE: 106 MMOL/L (ref 101–111)
CO2: 25 MMOL/L (ref 22–32)
CREAT BLD-MCNC: 0.94 MG/DL (ref 0.55–1.02)
EOSINOPHIL # BLD AUTO: 0.05 X10(3) UL (ref 0–0.3)
EOSINOPHIL NFR BLD AUTO: 0.4 %
ERYTHROCYTE [DISTWIDTH] IN BLOOD BY AUTOMATED COUNT: 11.9 % (ref 11.5–16)
GLUCOSE BLD-MCNC: 109 MG/DL (ref 70–99)
HCT VFR BLD AUTO: 39.4 % (ref 34–50)
HGB BLD-MCNC: 13.4 G/DL (ref 12–16)
IMMATURE GRANULOCYTE COUNT: 0.05 X10(3) UL (ref 0–1)
IMMATURE GRANULOCYTE RATIO %: 0.4 %
LYMPHOCYTES # BLD AUTO: 1.23 X10(3) UL (ref 0.9–4)
LYMPHOCYTES NFR BLD AUTO: 8.6 %
MCH RBC QN AUTO: 30 PG (ref 27–33.2)
MCHC RBC AUTO-ENTMCNC: 34 G/DL (ref 31–37)
MCV RBC AUTO: 88.3 FL (ref 81–100)
MONOCYTES # BLD AUTO: 0.7 X10(3) UL (ref 0.1–1)
MONOCYTES NFR BLD AUTO: 4.9 %
NEUTROPHIL ABS PRELIM: 12.2 X10 (3) UL (ref 1.3–6.7)
NEUTROPHILS # BLD AUTO: 12.2 X10(3) UL (ref 1.3–6.7)
NEUTROPHILS NFR BLD AUTO: 85.5 %
PLATELET # BLD AUTO: 302 10(3)UL (ref 150–450)
POTASSIUM SERPL-SCNC: 3.2 MMOL/L (ref 3.6–5.1)
POTASSIUM SERPL-SCNC: 3.8 MMOL/L (ref 3.6–5.1)
RBC # BLD AUTO: 4.46 X10(6)UL (ref 3.8–5.1)
RED CELL DISTRIBUTION WIDTH-SD: 38.4 FL (ref 35.1–46.3)
SODIUM SERPL-SCNC: 140 MMOL/L (ref 136–144)
WBC # BLD AUTO: 14.3 X10(3) UL (ref 4–13)

## 2018-03-24 PROCEDURE — 99224 SUBSEQUENT OBSERVATION CARE: CPT | Performed by: SURGERY

## 2018-03-24 PROCEDURE — 99225 SUBSEQUENT OBSERVATION CARE: CPT | Performed by: STUDENT IN AN ORGANIZED HEALTH CARE EDUCATION/TRAINING PROGRAM

## 2018-03-24 RX ORDER — POTASSIUM CHLORIDE 20 MEQ/1
40 TABLET, EXTENDED RELEASE ORAL EVERY 4 HOURS
Status: COMPLETED | OUTPATIENT
Start: 2018-03-24 | End: 2018-03-24

## 2018-03-24 RX ORDER — AMOXICILLIN AND CLAVULANATE POTASSIUM 875; 125 MG/1; MG/1
1 TABLET, FILM COATED ORAL 2 TIMES DAILY
Qty: 22 TABLET | Refills: 0 | Status: SHIPPED | OUTPATIENT
Start: 2018-03-24 | End: 2018-03-24

## 2018-03-24 RX ORDER — METRONIDAZOLE 500 MG/1
500 TABLET ORAL 3 TIMES DAILY
Qty: 30 TABLET | Refills: 0 | Status: SHIPPED | OUTPATIENT
Start: 2018-03-24 | End: 2018-04-03 | Stop reason: ALTCHOICE

## 2018-03-24 RX ORDER — KETOROLAC TROMETHAMINE 10 MG/1
10 TABLET, FILM COATED ORAL EVERY 6 HOURS PRN
Qty: 8 TABLET | Refills: 0 | Status: SHIPPED | OUTPATIENT
Start: 2018-03-24 | End: 2018-04-03 | Stop reason: ALTCHOICE

## 2018-03-24 RX ORDER — AMOXICILLIN AND CLAVULANATE POTASSIUM 500; 125 MG/1; MG/1
1 TABLET, FILM COATED ORAL 3 TIMES DAILY
Qty: 30 TABLET | Refills: 0 | Status: SHIPPED | OUTPATIENT
Start: 2018-03-24 | End: 2018-04-03 | Stop reason: ALTCHOICE

## 2018-03-24 NOTE — PROGRESS NOTES
[facility]  Progress Note     Bobby Montes Patient Status:  Observation    1960 MRN [id number]   [facility] 3SW-A Attending Haresh Ennis MD   Hosp Day # 0 PCP Lauren Dexter MD     Chief Complaint: Abdominal pain     S: • enoxaparin  40 mg Subcutaneous Nightly   • piperacillin-tazobactam  3.375 g Intravenous Q8H   • Levothyroxine Sodium  75 mcg Oral Before breakfast   • Losartan Potassium  50 mg Oral Daily       ASSESSMENT / PLAN:     1. Recurrent diverticulitis  2.  Hyp

## 2018-03-24 NOTE — PROGRESS NOTES
[facility]  Progress Note    Rashad Houston Patient Status:  Observation    1960 MRN [id number]   [facility] 3SW-A Attending Patricia Hernandez MD   Hosp Day # 0 PCP Elisa Bella MD     Subjective:  Patient ambulating the hallway Insomnia     Hypothyroidism (acquired)     Essential hypertension     Acute diverticulitis      Impression: Resolving diverticulitis  Plan: Discharge to home today if okay with medical service will continue antibiotics for 10 days follow-up in the office

## 2018-03-25 NOTE — DISCHARGE SUMMARY
[facility]  DISCHARGE SUMMARY     Britton Gitelman Patient Status:  Observation    1960 MRN [id number]   [facility] 3SW-A Attending No att. providers found   Hosp Day # 0 PCP Ching Stevens MD     Date of Admission: 3/21/2018 been advised to have OP colonoscopy in 6 weeks to rule out malignancy.     Procedures during hospitalization:   • no    Incidental or significant findings and recommendations (brief descriptions):  • no    Lab/Test results pending at Discharge:   · no    Co DOMI [facility] [address]  [phone], [address] [id number]    Phone:  [phone]   · Amoxicillin-Pot Clavulanate 500-125 MG Tabs  · metRONIDAZOLE 500 MG Tabs     Please  your prescriptions at the location [facility]

## 2018-03-26 ENCOUNTER — TELEPHONE (OUTPATIENT)
Dept: FAMILY MEDICINE CLINIC | Facility: CLINIC | Age: 58
End: 2018-03-26

## 2018-03-26 NOTE — TELEPHONE ENCOUNTER
Plan: Discharge to home today if okay with medical service will continue antibiotics for 10 days follow-up in the office with me in 3 weeks  I spent  34  minutes face to face with the patient.  More than 50% of that time was spent counseling the patient and

## 2018-03-26 NOTE — TELEPHONE ENCOUNTER
Pt said she had a CT scan while she was in the hospital so she has had 2 now. Wants to know if she needs to have another one? She is scheduled for 3/31 but wonders if she should cancel that.

## 2018-03-27 ENCOUNTER — PATIENT OUTREACH (OUTPATIENT)
Dept: CASE MANAGEMENT | Age: 58
End: 2018-03-27

## 2018-03-27 ENCOUNTER — TELEPHONE (OUTPATIENT)
Dept: INTERNAL MEDICINE CLINIC | Facility: CLINIC | Age: 58
End: 2018-03-27

## 2018-03-27 DIAGNOSIS — Z02.9 ENCOUNTERS FOR ADMINISTRATIVE PURPOSE: ICD-10-CM

## 2018-03-27 RX ORDER — ACETAMINOPHEN 500 MG
500 TABLET ORAL EVERY 6 HOURS PRN
COMMUNITY
End: 2018-04-03 | Stop reason: ALTCHOICE

## 2018-03-27 NOTE — TELEPHONE ENCOUNTER
Contacted the pt for TCM. Pt has questions regarding the diet she was put on at discharge. Pt stated she is on a full liquid diet this week however she would like clarification which foods to avoid while on full liquids.  Pt stated she was provided two

## 2018-03-27 NOTE — TELEPHONE ENCOUNTER
Per Dr. Xenia Thomas pt can puree tomatoes to make soup. He states she does not need another CT scan and she can cancel it.      LMTCB for the pt. chika

## 2018-03-27 NOTE — TELEPHONE ENCOUNTER
Spoke with patient and informed Dr. Juan Wade is out of the office until Thursday. She verbalized understanding and states she has already reached out to Dr. Reji Jenkins office.

## 2018-03-27 NOTE — PROGRESS NOTES
Initial Post Discharge Follow Up   Discharge Date: 3/24/18  Contact Date: 3/27/2018    Consent Verification:  Assessment Completed With: Patient  HIPAA Verified?   Yes    Discharge Dx:   Recurrent Diverticulitis, Hypertension    General:   • How have you by mouth 3 (three) times daily. Disp: 30 tablet Rfl: 0   Ketorolac Tromethamine 10 MG Oral Tab Take 1 tablet (10 mg total) by mouth every 6 (six) hours as needed for Pain.  Disp: 8 tablet Rfl: 0   Levothyroxine Sodium 75 MCG Oral Tab Take 75 mcg by mouth be disease concerns, Etc): Yes- Pt stated she is on a full liquid diet this week however she would like clarification as to which foods to avoid while on full liquids. Pt stated she was provided two different hand outs that contradict each other.  One form say before your exam as you will be drinking contrast at home prior to your appointment. Contrast must be dispensed by a CT Technologist or nurse.  Generally you will not encounter a wait, however please be aware you may experience a wait of up to 15 minutes i Jalyin ([facility])    Apr 26, 2018  9:00 AM CDT Colonoscopy with Shantelle Pond MD [facility]    Please arrive 30 minutes prior to your scheduled procedure time.          [address]

## 2018-03-28 NOTE — TELEPHONE ENCOUNTER
The pt called back and is aware she can puree tomatoes to make soup and she does not need her CT scan.  Pt v/u and I cancelled her CT scan. chika

## 2018-04-03 ENCOUNTER — OFFICE VISIT (OUTPATIENT)
Dept: INTERNAL MEDICINE CLINIC | Facility: CLINIC | Age: 58
End: 2018-04-03

## 2018-04-03 VITALS
BODY MASS INDEX: 37.1 KG/M2 | HEIGHT: 63.5 IN | WEIGHT: 212 LBS | TEMPERATURE: 98 F | RESPIRATION RATE: 16 BRPM | DIASTOLIC BLOOD PRESSURE: 74 MMHG | HEART RATE: 81 BPM | SYSTOLIC BLOOD PRESSURE: 112 MMHG | OXYGEN SATURATION: 99 %

## 2018-04-03 DIAGNOSIS — Z12.31 ENCOUNTER FOR SCREENING MAMMOGRAM FOR MALIGNANT NEOPLASM OF BREAST: ICD-10-CM

## 2018-04-03 DIAGNOSIS — Z09 HOSPITAL DISCHARGE FOLLOW-UP: Primary | ICD-10-CM

## 2018-04-03 DIAGNOSIS — K57.92 ACUTE DIVERTICULITIS: ICD-10-CM

## 2018-04-03 PROCEDURE — 99495 TRANSJ CARE MGMT MOD F2F 14D: CPT | Performed by: INTERNAL MEDICINE

## 2018-04-03 PROCEDURE — 1111F DSCHRG MED/CURRENT MED MERGE: CPT | Performed by: INTERNAL MEDICINE

## 2018-04-03 NOTE — PROGRESS NOTES
HPI:    Lauren Smith is a 56year old female here today for hospital follow up.    She was discharged from Inpatient hospital, [facility] to Home   Admission Date: 3/21/18   Discharge Date: 3/24/18  Hospital Discharge Diagnoses (since 3/4/2018) after discharge from the hospital.        Discharge Diagnosis:   Recurrent Diverticulitis   Hypertension  Hypothyroid      History of Present Illness:   Jairo Abdulaziz a 56year old female with past medical history of hypertension, hypothyroidism p history of Acute bronchitis; Disorder of thyroid; Diverticulitis; High blood pressure; and Post-nasal drip. She  has a past surgical history that includes ; hernia surgery (); and hysterectomy (2015 ).     She family history includes fi Follow up for c-scope  - check mammogram as pt is due    No orders of the defined types were placed in this encounter.       Meds & Refills for this Visit:  No prescriptions requested or ordered in this encounter    Imaging & Consults:  MARISOL SCREENING BILAT

## 2018-04-03 NOTE — PATIENT INSTRUCTIONS
Diverticulitis    Some people get pouches along the wall of the colon as they get older. The pouches, called diverticuli, usually cause no symptoms. If the pouches become blocked, you can get an infection. This infection is called diverticulitis.  It caus Once you have an episode of diverticulitis, you are at risk for having it again. After you have recovered from this episode, you may be able to lower your risk by eating a high-fiber diet (20 gm/day to 35 gm/day of fiber).  This cleans out the colon pouches © 4357-7705 [employer] [address] [facility], [facility] [employer]. All rights reserved. This information is not intended as a substitute for professional medical care. Always follow your healthcare professional's instructions.

## 2018-04-11 ENCOUNTER — OFFICE VISIT (OUTPATIENT)
Dept: SURGERY | Facility: CLINIC | Age: 58
End: 2018-04-11

## 2018-04-11 VITALS
SYSTOLIC BLOOD PRESSURE: 125 MMHG | RESPIRATION RATE: 16 BRPM | HEART RATE: 98 BPM | BODY MASS INDEX: 37 KG/M2 | WEIGHT: 210 LBS | TEMPERATURE: 98 F | DIASTOLIC BLOOD PRESSURE: 84 MMHG

## 2018-04-11 DIAGNOSIS — K57.92 DIVERTICULITIS: Primary | ICD-10-CM

## 2018-04-11 PROCEDURE — 99214 OFFICE O/P EST MOD 30 MIN: CPT | Performed by: SURGERY

## 2018-04-11 NOTE — PROGRESS NOTES
Follow Up Visit Note       Active Problems      No diagnosis found.       Chief Complaint   Patient presents with:  Diverticulitis: DIVERTICULITIS CONTINUED CARE AND TREATMENT        History of Present Illness patient presents for reexamination follow-up fr Glasses of wine: 1 per week       Comment: social    Drug use: No            Other Topics            Concern  Caffeine Concern        Yes    Comment:2 during the week  Exercise                No         Current Outpatient Prescriptions:  Levothyroxine Physical Exam     General Pleasant female in no acute distress. Eyes. No scleral ictarus. Conjunctivae moist.  Pupils equal.  ENT. Moist mucous membranes. No intra oral lesions. Trachea midline. Cardiovascular. Regular rate and rhythm.   2+ Pulses

## 2018-04-26 DIAGNOSIS — E03.9 HYPOTHYROIDISM (ACQUIRED): ICD-10-CM

## 2018-04-26 RX ORDER — LEVOTHYROXINE SODIUM 0.07 MG/1
TABLET ORAL
Qty: 90 TABLET | Refills: 1 | Status: SHIPPED | OUTPATIENT
Start: 2018-04-26 | End: 2018-10-31

## 2018-08-15 ENCOUNTER — TELEPHONE (OUTPATIENT)
Dept: INTERNAL MEDICINE CLINIC | Facility: CLINIC | Age: 58
End: 2018-08-15

## 2018-08-15 DIAGNOSIS — Z13.1 SCREENING FOR DIABETES MELLITUS (DM): ICD-10-CM

## 2018-08-15 DIAGNOSIS — Z13.0 SCREENING, ANEMIA, DEFICIENCY, IRON: ICD-10-CM

## 2018-08-15 DIAGNOSIS — Z13.29 SCREENING FOR THYROID DISORDER: ICD-10-CM

## 2018-08-15 DIAGNOSIS — Z13.220 SCREENING FOR LIPID DISORDERS: ICD-10-CM

## 2018-08-15 DIAGNOSIS — Z13.228 SCREENING FOR METABOLIC DISORDER: ICD-10-CM

## 2018-08-15 DIAGNOSIS — Z13.89 SCREENING FOR BLOOD OR PROTEIN IN URINE: ICD-10-CM

## 2018-09-03 DIAGNOSIS — I10 ESSENTIAL HYPERTENSION: ICD-10-CM

## 2018-09-04 RX ORDER — LOSARTAN POTASSIUM 50 MG/1
TABLET ORAL
Qty: 90 TABLET | Refills: 0 | Status: SHIPPED | OUTPATIENT
Start: 2018-09-04 | End: 2018-12-04

## 2018-10-18 ENCOUNTER — TELEPHONE (OUTPATIENT)
Dept: INTERNAL MEDICINE CLINIC | Facility: CLINIC | Age: 58
End: 2018-10-18

## 2018-10-18 DIAGNOSIS — Z00.00 LABORATORY EXAMINATION ORDERED AS PART OF A ROUTINE GENERAL MEDICAL EXAMINATION: Primary | ICD-10-CM

## 2018-10-23 ENCOUNTER — TELEPHONE (OUTPATIENT)
Dept: INTERNAL MEDICINE CLINIC | Facility: CLINIC | Age: 58
End: 2018-10-23

## 2018-10-23 DIAGNOSIS — K57.92 ACUTE DIVERTICULITIS: Primary | ICD-10-CM

## 2018-10-23 RX ORDER — METRONIDAZOLE 500 MG/1
500 TABLET ORAL 3 TIMES DAILY
Qty: 30 TABLET | Refills: 0 | Status: SHIPPED | OUTPATIENT
Start: 2018-10-23 | End: 2018-11-02

## 2018-10-23 RX ORDER — ONDANSETRON 4 MG/1
4 TABLET, ORALLY DISINTEGRATING ORAL EVERY 6 HOURS PRN
Qty: 21 TABLET | Refills: 0 | Status: SHIPPED | OUTPATIENT
Start: 2018-10-23 | End: 2018-10-30

## 2018-10-23 RX ORDER — LEVOFLOXACIN 500 MG/1
500 TABLET, FILM COATED ORAL DAILY
Qty: 10 TABLET | Refills: 0 | Status: SHIPPED | OUTPATIENT
Start: 2018-10-23 | End: 2018-11-02

## 2018-10-23 NOTE — TELEPHONE ENCOUNTER
Patient called requesting refills on AMOX-CLAV 500-125 MG TAB, METRONIDAZOLE 500MG TAB AND ONANSETRON ODT 4MG TAB.

## 2018-10-23 NOTE — TELEPHONE ENCOUNTER
Spoke with pt she has h/o divertiulits. She developed some \"nagging\" abdominal pain on the left side of her abdomen last night. Pt states this is off/on and not constant.   She will be traveling to [facility] next week and would like refills on requested med

## 2018-10-31 DIAGNOSIS — E03.9 HYPOTHYROIDISM (ACQUIRED): ICD-10-CM

## 2018-10-31 RX ORDER — LEVOTHYROXINE SODIUM 0.07 MG/1
TABLET ORAL
Qty: 90 TABLET | Refills: 0 | Status: SHIPPED | OUTPATIENT
Start: 2018-10-31 | End: 2018-12-17

## 2018-12-04 DIAGNOSIS — I10 ESSENTIAL HYPERTENSION: ICD-10-CM

## 2018-12-04 RX ORDER — LOSARTAN POTASSIUM 50 MG/1
TABLET ORAL
Qty: 90 TABLET | Refills: 0 | Status: SHIPPED | OUTPATIENT
Start: 2018-12-04 | End: 2019-03-11

## 2018-12-08 NOTE — TELEPHONE ENCOUNTER
Patient last seen 4/2018 and advised per Dr. Franky Terry to follow up 10/2018. Follow up labs ordered and left VM for patient advising above.      Fax replied advising TSH levels and medication management is being followed by Dr. Franky Terry Telephone Encounter by Allison Kenyon CMA at 04/18/17 05:44 PM     Author:  Allison Kenyon CMA Service:  (none) Author Type:  Certified Medical Assistant     Filed:  04/18/17 05:45 PM Encounter Date:  4/18/2017 Status:  Signed     :  Allison Kenyon CMA (Certified Medical Assistant)            Patient notified.[RR1.1T]  Patient verbalized understanding of all new dose changes and no further questions.[RR1.1M]           Revision History        User Key Date/Time User Provider Type Action    > RR1.1 04/18/17 05:45 PM Allison Kenyon CMA Certified Medical Assistant Sign    M - Manual, T - Template

## 2018-12-12 ENCOUNTER — HOSPITAL ENCOUNTER (OUTPATIENT)
Dept: MAMMOGRAPHY | Age: 58
Discharge: HOME | End: 2018-12-12
Attending: INTERNAL MEDICINE
Payer: COMMERCIAL

## 2018-12-12 DIAGNOSIS — Z12.31 ENCOUNTER FOR SCREENING MAMMOGRAM FOR MALIGNANT NEOPLASM OF BREAST: ICD-10-CM

## 2018-12-12 PROCEDURE — 77067 SCR MAMMO BI INCL CAD: CPT | Performed by: INTERNAL MEDICINE

## 2018-12-12 PROCEDURE — 77063 BREAST TOMOSYNTHESIS BI: CPT | Performed by: INTERNAL MEDICINE

## 2018-12-13 PROBLEM — K57.30 DIVERTICULOSIS OF LARGE INTESTINE WITHOUT PERFORATION OR ABSCESS WITHOUT BLEEDING: Status: ACTIVE | Noted: 2018-12-13

## 2018-12-13 PROBLEM — D12.5 BENIGN NEOPLASM OF SIGMOID COLON: Status: ACTIVE | Noted: 2018-12-13

## 2018-12-13 PROBLEM — Z86.010 PERSONAL HISTORY OF COLONIC POLYPS: Status: ACTIVE | Noted: 2018-12-13

## 2018-12-17 ENCOUNTER — OFFICE VISIT (OUTPATIENT)
Dept: INTERNAL MEDICINE CLINIC | Facility: CLINIC | Age: 58
End: 2018-12-17
Payer: COMMERCIAL

## 2018-12-17 VITALS
WEIGHT: 205 LBS | HEART RATE: 86 BPM | SYSTOLIC BLOOD PRESSURE: 126 MMHG | OXYGEN SATURATION: 98 % | DIASTOLIC BLOOD PRESSURE: 80 MMHG | RESPIRATION RATE: 16 BRPM | BODY MASS INDEX: 35.87 KG/M2 | HEIGHT: 63.5 IN | TEMPERATURE: 98 F

## 2018-12-17 DIAGNOSIS — Z28.21 INFLUENZA VACCINATION DECLINED BY PATIENT: ICD-10-CM

## 2018-12-17 DIAGNOSIS — E03.8 HYPOTHYROIDISM DUE TO HASHIMOTO'S THYROIDITIS: ICD-10-CM

## 2018-12-17 DIAGNOSIS — E06.3 HYPOTHYROIDISM DUE TO HASHIMOTO'S THYROIDITIS: ICD-10-CM

## 2018-12-17 DIAGNOSIS — Z00.00 ANNUAL PHYSICAL EXAM: Primary | ICD-10-CM

## 2018-12-17 PROCEDURE — 99396 PREV VISIT EST AGE 40-64: CPT | Performed by: INTERNAL MEDICINE

## 2018-12-17 RX ORDER — LEVOTHYROXINE SODIUM 0.1 MG/1
100 TABLET ORAL
Qty: 90 TABLET | Refills: 1 | Status: SHIPPED | OUTPATIENT
Start: 2018-12-17 | End: 2019-02-01

## 2018-12-17 NOTE — PROGRESS NOTES
HPI:    Patient ID: Lauren Smith is a 66year old female. HPI  HPI:   Lauren Smith is a 66year old female who presents for a complete physical exam. Symptoms: denies discharge, itching, burning or dysuria, is S/P GAGAN, ovaries preserved.  Sander Dowd Past Medical History:   Diagnosis Date   • Abdominal hernia    • Acute bronchitis    • Arthritis    • Disorder of thyroid    • Diverticulitis    • High blood pressure    • Hypothyroidism    • Post-nasal drip       Past Surgical History:   Procedure Later nourished,in no apparent distress  SKIN: no rashes,no suspicious lesions  HEENT: atraumatic, normocephalic,ears and throat are clear  EYES:PERRLA, EOMI, normal optic disk,conjunctiva are clear  NECK: supple,no adenopathy,no bruits  LUNGS: clear to ausculta hashimoto's thyroiditis    Orders Placed This Encounter      TSH [E]      Meds This Visit:  Requested Prescriptions     Signed Prescriptions Disp Refills   • Levothyroxine Sodium 100 MCG Oral Tab 90 tablet 1     Sig: Take 1 tablet (100 mcg total) by mouth

## 2019-01-22 NOTE — TELEPHONE ENCOUNTER
From: Jose Antonio Alfredo  To: Candy Garrison MD  Sent: 1/22/2019 9:22 AM CST  Subject: Prescription Question    Hi Dr. Santana Cabot,  I need a renewal of my Zolpidem prescription. I am having sleep issues again. Can you please approve a refill?     My pharmacy i

## 2019-01-23 ENCOUNTER — TELEPHONE (OUTPATIENT)
Dept: INTERNAL MEDICINE CLINIC | Facility: CLINIC | Age: 59
End: 2019-01-23

## 2019-01-23 NOTE — TELEPHONE ENCOUNTER
Fax request from [facility] for a refill on Levothyroxine 75 MCG tabs qty 90 . Paper in triage.

## 2019-01-27 DIAGNOSIS — E03.9 HYPOTHYROIDISM (ACQUIRED): ICD-10-CM

## 2019-01-28 RX ORDER — LEVOTHYROXINE SODIUM 0.07 MG/1
TABLET ORAL
Qty: 90 TABLET | Refills: 0 | OUTPATIENT
Start: 2019-01-28

## 2019-01-28 NOTE — TELEPHONE ENCOUNTER
Rx not sent for Levothyroxine 75mcg. The pt is on Levothyroxine 100mcg and the last refill was sent on 12/17/2018 for a 6 month supply.

## 2019-02-01 ENCOUNTER — TELEPHONE (OUTPATIENT)
Dept: INTERNAL MEDICINE CLINIC | Facility: CLINIC | Age: 59
End: 2019-02-01

## 2019-02-01 DIAGNOSIS — E03.8 HYPOTHYROIDISM DUE TO HASHIMOTO'S THYROIDITIS: ICD-10-CM

## 2019-02-01 DIAGNOSIS — E06.3 HYPOTHYROIDISM DUE TO HASHIMOTO'S THYROIDITIS: ICD-10-CM

## 2019-02-01 RX ORDER — LEVOTHYROXINE SODIUM 0.1 MG/1
100 TABLET ORAL
Qty: 90 TABLET | Refills: 1 | Status: SHIPPED | OUTPATIENT
Start: 2019-02-01 | End: 2019-04-12

## 2019-02-01 NOTE — TELEPHONE ENCOUNTER
Refill request from [facility] pharmacy on file for 90 day supply of Levothyroxine Sodium 100 MCG - form in triage.

## 2019-03-11 DIAGNOSIS — I10 ESSENTIAL HYPERTENSION: ICD-10-CM

## 2019-03-11 RX ORDER — LOSARTAN POTASSIUM 50 MG/1
TABLET ORAL
Qty: 90 TABLET | Refills: 0 | Status: CANCELLED | OUTPATIENT
Start: 2019-03-11

## 2019-03-12 DIAGNOSIS — I10 ESSENTIAL HYPERTENSION: ICD-10-CM

## 2019-03-12 RX ORDER — LOSARTAN POTASSIUM 50 MG/1
TABLET ORAL
Qty: 90 TABLET | Refills: 0 | OUTPATIENT
Start: 2019-03-12

## 2019-03-25 DIAGNOSIS — F51.04 PSYCHOPHYSIOLOGICAL INSOMNIA: ICD-10-CM

## 2019-03-25 RX ORDER — ZOLPIDEM TARTRATE 5 MG/1
5 TABLET ORAL NIGHTLY PRN
Qty: 30 TABLET | Refills: 1 | Status: SHIPPED
Start: 2019-03-25 | End: 2019-04-12

## 2019-03-25 NOTE — TELEPHONE ENCOUNTER
Refill request from [address] [place] [address] for Zolpidem Tartrate 5 MG - form in triage.

## 2019-04-01 RX ORDER — TRAMADOL HYDROCHLORIDE 50 MG/1
50 TABLET ORAL EVERY 8 HOURS PRN
Qty: 21 TABLET | Refills: 0 | OUTPATIENT
Start: 2019-04-01

## 2019-04-01 NOTE — TELEPHONE ENCOUNTER
Spoke with pt she states she does not believe she is having a reoccurrence of diverticulitis she is just having some residual pain and requesting pain medication. Per Dr. Zak Suarez Tramadol 50mg 1 tab every 8 hours as needed for pain #21.     D/w pt above she

## 2019-04-01 NOTE — TELEPHONE ENCOUNTER
Hi Dr. Sarah Buckley,     I am having a niggling pain at the place where my diverticultis occurred. Is there a pain killer you can prescribe? I am travelling on Monday night out of town.  Can you advise and if possible send the prescription so I can pick it up befor

## 2019-04-12 ENCOUNTER — PATIENT MESSAGE (OUTPATIENT)
Dept: INTERNAL MEDICINE CLINIC | Facility: CLINIC | Age: 59
End: 2019-04-12

## 2019-04-12 DIAGNOSIS — I10 ESSENTIAL HYPERTENSION: ICD-10-CM

## 2019-04-12 DIAGNOSIS — F51.04 PSYCHOPHYSIOLOGICAL INSOMNIA: ICD-10-CM

## 2019-04-12 DIAGNOSIS — E03.8 HYPOTHYROIDISM DUE TO HASHIMOTO'S THYROIDITIS: ICD-10-CM

## 2019-04-12 DIAGNOSIS — E06.3 HYPOTHYROIDISM DUE TO HASHIMOTO'S THYROIDITIS: ICD-10-CM

## 2019-04-12 RX ORDER — ZOLPIDEM TARTRATE 5 MG/1
5 TABLET ORAL NIGHTLY PRN
Qty: 30 TABLET | Refills: 1 | Status: SHIPPED
Start: 2019-04-12

## 2019-04-12 RX ORDER — LEVOTHYROXINE SODIUM 0.1 MG/1
100 TABLET ORAL
Qty: 90 TABLET | Refills: 1 | Status: SHIPPED | OUTPATIENT
Start: 2019-04-12 | End: 2019-10-03

## 2019-04-12 NOTE — TELEPHONE ENCOUNTER
From: Bobby Montes  To: Corrie Barraza MD  Sent: 4/12/2019 6:18 AM CDT  Subject: Prescription Question    Good morning Dr. Nadine Vernon,    I have taken up a new job and in the process of shifting to [place], [place].  In the move, my medicine box conta

## 2019-04-22 DIAGNOSIS — I10 ESSENTIAL HYPERTENSION: ICD-10-CM

## 2019-04-23 RX ORDER — LOSARTAN POTASSIUM 50 MG/1
TABLET ORAL
Qty: 90 TABLET | Refills: 0 | Status: SHIPPED | OUTPATIENT
Start: 2019-04-23 | End: 2019-09-20

## 2019-09-20 ENCOUNTER — TELEPHONE (OUTPATIENT)
Dept: INTERNAL MEDICINE CLINIC | Facility: CLINIC | Age: 59
End: 2019-09-20

## 2019-09-20 DIAGNOSIS — I10 ESSENTIAL HYPERTENSION: ICD-10-CM

## 2019-09-20 RX ORDER — LOSARTAN POTASSIUM 50 MG/1
TABLET ORAL
Qty: 30 TABLET | Refills: 0 | Status: SHIPPED | OUTPATIENT
Start: 2019-09-20

## 2019-10-03 DIAGNOSIS — E03.8 HYPOTHYROIDISM DUE TO HASHIMOTO'S THYROIDITIS: ICD-10-CM

## 2019-10-03 DIAGNOSIS — E06.3 HYPOTHYROIDISM DUE TO HASHIMOTO'S THYROIDITIS: ICD-10-CM

## 2019-10-03 RX ORDER — LEVOTHYROXINE SODIUM 0.1 MG/1
TABLET ORAL
Qty: 90 TABLET | Refills: 0 | Status: SHIPPED | OUTPATIENT
Start: 2019-10-03

## 2019-10-03 NOTE — TELEPHONE ENCOUNTER
Last time medication was refilled 04/2019   Quantity  and number of refills 90 with 1 refill    Last office visit 20/2018   Next office visit 12/2019

## (undated) NOTE — MR AVS SNAPSHOT
[address]  [address] [id number]  [phone]               Thank you for choosing us for your health care visit with Chinyere Mclean MD.  We are glad to serve you and happy to provide you with this s Trapezius strain, left, initial encounter    -  Primary      Instructions and Information about Your Health      Muscle Strain in the Extremities  A muscle strain is a stretching and tearing of muscle fibers.  This causes pain, especially when you move hilton substitute for professional medical care. Always follow your healthcare professional's instructions.              Allergies as of May 17, 2017     Doxycycline Diarrhea    Sulfa Antibiotics Rash, Swelling                Today's Vital Signs     BP Pulse Temp Call [phone] for help. [employer] is NOT to be used for urgent needs. For medical emergencies, dial 911.         Educational Information     Healthy Diet and Regular Exercise  [employer] [place] for making healthy food choices  -

## (undated) NOTE — Clinical Note
Pt is coming for hospital follow up on 4/3/18. Pt stated she is doing better since d/c. Pt is managing her pain is OTC Tylenol and feels the diarrhea is improving. Pt has an apt with Dr. Tom Dotson on 4/11/18.  The pt does have questions regarding the full liqui

## (undated) NOTE — MR AVS SNAPSHOT
[address] [facility] [address]  [address] [facility] [address]  [phone]               Thank you for choosing us for your health care visit with Chasity Reed MD.  We are glad to serve you and happy to provide you with this s You can access your MyChart to more actively manage your health care and view more details from this visit by going to [email or web address].   If you've recently had a stay at the Hospital you can access your discharge instructions in [address] by melisa

## (undated) NOTE — Clinical Note
Deshawn Pereyra saw Jayda Holbrook in my office today the following is a summation of my notes. Recurrent sigmoid diverticulitis. Patient is currently scheduled for colonoscopy with Dr. Agusto Faulkner in June.   This will rule out occult carcinoma mimicking sigmoid div